# Patient Record
Sex: FEMALE | Race: WHITE | Employment: PART TIME | URBAN - METROPOLITAN AREA
[De-identification: names, ages, dates, MRNs, and addresses within clinical notes are randomized per-mention and may not be internally consistent; named-entity substitution may affect disease eponyms.]

---

## 2018-08-04 ENCOUNTER — HOSPITAL ENCOUNTER (EMERGENCY)
Age: 63
Discharge: HOME | End: 2018-08-04
Payer: COMMERCIAL

## 2018-08-04 VITALS
SYSTOLIC BLOOD PRESSURE: 173 MMHG | RESPIRATION RATE: 18 BRPM | OXYGEN SATURATION: 96 % | BODY MASS INDEX: 21.97 KG/M2 | DIASTOLIC BLOOD PRESSURE: 88 MMHG | TEMPERATURE: 98.2 F | HEIGHT: 67 IN | HEART RATE: 63 BPM | WEIGHT: 140 LBS

## 2018-08-04 DIAGNOSIS — B02.9 HERPES ZOSTER WITHOUT COMPLICATION: Primary | ICD-10-CM

## 2018-08-04 PROCEDURE — 99282 EMERGENCY DEPT VISIT SF MDM: CPT

## 2018-08-04 RX ORDER — OXYCODONE HYDROCHLORIDE AND ACETAMINOPHEN 5; 325 MG/1; MG/1
1 TABLET ORAL EVERY 6 HOURS PRN
Qty: 10 TABLET | Refills: 0 | Status: SHIPPED | OUTPATIENT
Start: 2018-08-04 | End: 2018-08-07

## 2018-08-04 RX ORDER — IBUPROFEN AND FAMOTIDINE 26.6; 8 MG/1; MG/1
1 TABLET, FILM COATED ORAL DAILY
COMMUNITY

## 2018-08-04 RX ORDER — ACYCLOVIR 800 MG/1
800 TABLET ORAL 4 TIMES DAILY
Qty: 40 TABLET | Refills: 0 | Status: SHIPPED | OUTPATIENT
Start: 2018-08-04 | End: 2018-08-14

## 2018-08-04 ASSESSMENT — PAIN DESCRIPTION - PAIN TYPE: TYPE: ACUTE PAIN

## 2018-08-04 ASSESSMENT — PAIN DESCRIPTION - PROGRESSION: CLINICAL_PROGRESSION: NOT CHANGED

## 2018-08-04 ASSESSMENT — PAIN DESCRIPTION - FREQUENCY: FREQUENCY: CONTINUOUS

## 2018-08-04 ASSESSMENT — PAIN SCALES - GENERAL: PAINLEVEL_OUTOF10: 4

## 2018-08-04 ASSESSMENT — PAIN DESCRIPTION - ORIENTATION: ORIENTATION: RIGHT

## 2018-08-04 ASSESSMENT — PAIN DESCRIPTION - DESCRIPTORS: DESCRIPTORS: ACHING;CONSTANT;DISCOMFORT

## 2018-08-04 ASSESSMENT — PAIN DESCRIPTION - LOCATION: LOCATION: BACK;ABDOMEN;FLANK

## 2018-08-04 ASSESSMENT — PAIN DESCRIPTION - ONSET: ONSET: ON-GOING

## 2018-08-04 NOTE — ED PROVIDER NOTES
[facility]        Department of Emergency Medicine   ED  Provider Note  Admit Date/RoomTime: 2018  3:49 PM  ED Room:   Chief Complaint   Herpes Zoster (starts on R side of back and wraps around flank to abd,started yesteday morning)    History of Present Illness   Source of history provided by:  patient and spouse/SO. History/Exam Limitations: none. Alberto Pham is a 58 y.o. old female with has a past medical history of: History reviewed. No pertinent past medical history. presents to the emergency department by private vehicle and accompanied by spouse/SO, for complaint of gradual onset, still present red, raised, itchy, painful and blistering area on  Right back around into right abdomen which began a few day(s) prior to arrival.  The symptoms were caused by unknown cause. Since onset the symptoms have been persistent and worsening. Prior history of similar episodes: No.   Her symptoms are associated with rash, itching and pain and relieved by nothing. She denies any difficulty breathing, difficulty swallowing, throat tightness, hoarseness, lightheadedness, dizziness, facial swelling, lip swelling or tongue swelling. She states the rash followed a couple of days of pain to the side followed by hypersensitivity to the area. Rash developed to the hypersensitive area yesterday and is worse today. ROS    Pertinent positives and negatives are stated within HPI, all other systems reviewed and are negative. Past Surgical History:   Procedure Laterality Date     SECTION      x2    DILATION AND CURETTAGE      TUBAL LIGATION     Social History:  reports that she has quit smoking. She has never used smokeless tobacco. She reports that she drinks alcohol. She reports that she does not use drugs. Family History: family history includes Cancer in her father, maternal aunt, maternal grandmother, and sister; Diabetes in her mother.    Allergies: Patient has no known discussed todays results, in addition to providing specific details for the plan of care and counseling regarding the diagnosis and prognosis. Questions are answered at this time and they are agreeable with the plan. Assessment      1. Herpes zoster without complication      Plan   Discharge to home  Patient condition is stable    New Medications     New Prescriptions    ACYCLOVIR (ZOVIRAX) 800 MG TABLET    Take 1 tablet by mouth 4 times daily for 10 days    OXYCODONE-ACETAMINOPHEN (PERCOCET) 5-325 MG PER TABLET    Take 1 tablet by mouth every 6 hours as needed for Pain for up to 3 days. Ivania Colby PRAMOXINE-CALAMINE (CALADRYL) 1-8 % LOTION    Apply topically as needed for Itching (pain)     Electronically signed by ALEJANDRINA Sun CNP   DD: 8/4/18  **This report was transcribed using voice recognition software. Every effort was made to ensure accuracy; however, inadvertent computerized transcription errors may be present.   END OF ED PROVIDER NOTE       Sven Kussmaul, APRN - CNP  08/04/18 [id number]